# Patient Record
Sex: MALE | Race: BLACK OR AFRICAN AMERICAN | NOT HISPANIC OR LATINO | Employment: OTHER | URBAN - METROPOLITAN AREA
[De-identification: names, ages, dates, MRNs, and addresses within clinical notes are randomized per-mention and may not be internally consistent; named-entity substitution may affect disease eponyms.]

---

## 2017-02-22 ENCOUNTER — TELEPHONE (OUTPATIENT)
Dept: FAMILY MEDICINE | Facility: CLINIC | Age: 77
End: 2017-02-22

## 2017-02-22 DIAGNOSIS — K21.9 GASTROESOPHAGEAL REFLUX DISEASE, ESOPHAGITIS PRESENCE NOT SPECIFIED: ICD-10-CM

## 2017-02-22 NOTE — TELEPHONE ENCOUNTER
----- Message from Leann Gorman sent at 2/21/2017  3:48 PM CST -----  Contact: self  Pt need omeprazole (PRILOSEC) 20 MG capsule refilled. Please call [phone]. Thanks

## 2017-02-22 NOTE — TELEPHONE ENCOUNTER
"Former patient of . Spoke w/patient's wife, informed Ov is needed to establish care with another PCP of choice. Declined, states "no thank you".  "

## 2017-06-20 ENCOUNTER — PATIENT OUTREACH (OUTPATIENT)
Dept: ADMINISTRATIVE | Facility: HOSPITAL | Age: 77
End: 2017-06-20

## 2017-06-20 NOTE — PROGRESS NOTES
The patient has  on 2017 per his obituary on [facility]. The patient's chart was updated reflecting the change in status and an email was sent to HIM  patients.        A letter was mailed to the patient on today in regards to the information below.    The patient is due for a hypertension follow up.